# Patient Record
Sex: FEMALE | Race: WHITE | NOT HISPANIC OR LATINO
[De-identification: names, ages, dates, MRNs, and addresses within clinical notes are randomized per-mention and may not be internally consistent; named-entity substitution may affect disease eponyms.]

---

## 2019-05-13 PROBLEM — Z00.00 ENCOUNTER FOR PREVENTIVE HEALTH EXAMINATION: Status: ACTIVE | Noted: 2019-05-13

## 2019-08-02 ENCOUNTER — OTHER (OUTPATIENT)
Age: 39
End: 2019-08-02

## 2019-08-02 ENCOUNTER — APPOINTMENT (OUTPATIENT)
Dept: RHEUMATOLOGY | Facility: CLINIC | Age: 39
End: 2019-08-02
Payer: MEDICAID

## 2019-08-02 VITALS
BODY MASS INDEX: 26.98 KG/M2 | HEIGHT: 68 IN | SYSTOLIC BLOOD PRESSURE: 109 MMHG | OXYGEN SATURATION: 98 % | DIASTOLIC BLOOD PRESSURE: 73 MMHG | HEART RATE: 84 BPM | WEIGHT: 178 LBS

## 2019-08-02 DIAGNOSIS — Z78.9 OTHER SPECIFIED HEALTH STATUS: ICD-10-CM

## 2019-08-02 DIAGNOSIS — K21.9 GASTRO-ESOPHAGEAL REFLUX DISEASE W/OUT ESOPHAGITIS: ICD-10-CM

## 2019-08-02 PROCEDURE — 99204 OFFICE O/P NEW MOD 45 MIN: CPT

## 2019-08-02 RX ORDER — OMEPRAZOLE MAGNESIUM 20 MG/1
20 CAPSULE, DELAYED RELEASE ORAL
Refills: 0 | Status: ACTIVE | COMMUNITY

## 2019-08-02 RX ORDER — CETIRIZINE HYDROCHLORIDE 10 MG/1
10 CAPSULE, LIQUID FILLED ORAL
Refills: 0 | Status: ACTIVE | COMMUNITY

## 2019-08-03 NOTE — PHYSICAL EXAM
[General Appearance - Alert] : alert [General Appearance - Well Nourished] : well nourished [Sclera] : the sclera and conjunctiva were normal [General Appearance - Well Developed] : well developed [Oropharynx] : the oropharynx was normal [Neck Appearance] : the appearance of the neck was normal [Auscultation Breath Sounds / Voice Sounds] : lungs were clear to auscultation bilaterally [Respiration, Rhythm And Depth] : normal respiratory rhythm and effort [Heart Sounds] : normal S1 and S2 [Full Pulse] : the pedal pulses are present [Abdomen Tenderness] : non-tender [Edema] : there was no peripheral edema [Cervical Lymph Nodes Enlarged Anterior Bilaterally] : anterior cervical [Supraclavicular Lymph Nodes Enlarged Bilaterally] : supraclavicular [No Spinal Tenderness] : no spinal tenderness [Abnormal Walk] : normal gait [Nail Clubbing] : no clubbing  or cyanosis of the fingernails [Musculoskeletal - Swelling] : no joint swelling seen [Motor Tone] : muscle strength and tone were normal [FreeTextEntry1] : FROM neck, shoulders, elbows, wrists, hands, hips, knees, ankles and feet, including the small joints of the hands and feet without any evidence of inflammatory arthritis no tender points noted [] : no rash [Motor Exam] : the motor exam was normal [Deep Tendon Reflexes (DTR)] : deep tendon reflexes were 2+ and symmetric [Impaired Insight] : insight and judgment were intact [Oriented To Time, Place, And Person] : oriented to person, place, and time [Affect] : the affect was normal

## 2019-08-03 NOTE — HISTORY OF PRESENT ILLNESS
[FreeTextEntry1] : 38-year-old  female with no significant past medical history presents for further evaluation of joint pains. She has previously seen rheumatology at [place]. She was ruled out for rheumatoid arthritis.\par \par She is concerned that she has rheumatoid arthritis. She complains of pain in her joints, the worst pain is in her hands. She has good and bad days. The pain is definitely worse with rainy weather, when she does excessive work. She complains of pain in her hands occasionally her wrists. Her other joints bother her as well. She does not have relief with over-the-counter NSAIDs. She states that she had recent labs done in her CRP was mildly elevated. In the past she has had x-rays which were normal.\par \par She denies alopecia, oral ulcers, sicca symptoms or rebound phenomenon. She denies asthma, psoriasis or colitis. She denies fevers or chills or night sweats. She admits to morning stiffness, it can last for at least 30 minutes. She denies poor sleep. She denies thromboembolism.\par \par She admits to a total of 6 pregnancies with 4 miscarriages and 2 live births. She was diagnosed with MTHFR mutation during her pregnancy.\par She has a good appetite and denies weight loss. She is otherwise up to date on her age-appropriate screenings.

## 2019-08-03 NOTE — CONSULT LETTER
[Dear  ___] : Dear  [unfilled], [Consult Letter:] : I had the pleasure of evaluating your patient, [unfilled]. [Please see my note below.] : Please see my note below. [Consult Closing:] : Thank you very much for allowing me to participate in the care of this patient.  If you have any questions, please do not hesitate to contact me. [Sincerely,] : Sincerely, [FreeTextEntry3] : Shahnaz Eugene D.O\par

## 2019-08-03 NOTE — ASSESSMENT
[FreeTextEntry1] : 38-year-old  female with no significant past medical history presents for further evaluation of joint pains.\par \par Her current review of systems is positive for pain in most of her joints but her hands at the worst, the pain has been present for the last couple of years, it does wax and wane in nature. She has seen rheumatology and orthopedics, she states she has had x-rays which were normal and has been ruled out for rheumatoid arthritis in the past. More recent blood work shows her to be seronegative CRP was mildly elevated at 0.5. I reviewed these labs.\par \par Today, on examination she has full range of motion of her joints without any evidence of inflammatory arthritis. She does have pain and tenderness in multiple joints. She does not have fibromyalgia on exam.\par \par At this time the suspicion for rheumatoid arthritis or inflammatory arthritis is low. The most likely diagnosis may be early osteoarthritis.\par \par Plan-\par -she is to have repeat labs done.\par -She is to have repeat x-rays of her hands\par -She is concerned about rheumatoid arthritis, for completion I will order an MRI of the left hand to rule out inflammatory arthritis.\par -I have given her prescription for NSAIDs, she can use it on a p.r.n. basis with food.\par -Followup pending labs and imaging.\par -She is aware to call if her symptoms worsen.

## 2019-08-16 ENCOUNTER — OTHER (OUTPATIENT)
Age: 39
End: 2019-08-16

## 2019-08-19 ENCOUNTER — OTHER (OUTPATIENT)
Age: 39
End: 2019-08-19

## 2019-08-20 ENCOUNTER — TRANSCRIPTION ENCOUNTER (OUTPATIENT)
Age: 39
End: 2019-08-20

## 2019-08-21 ENCOUNTER — RX RENEWAL (OUTPATIENT)
Age: 39
End: 2019-08-21

## 2019-08-22 ENCOUNTER — OTHER (OUTPATIENT)
Age: 39
End: 2019-08-22

## 2019-08-30 ENCOUNTER — FORM ENCOUNTER (OUTPATIENT)
Age: 39
End: 2019-08-30

## 2019-08-31 ENCOUNTER — OUTPATIENT (OUTPATIENT)
Dept: OUTPATIENT SERVICES | Facility: HOSPITAL | Age: 39
LOS: 1 days | End: 2019-08-31
Payer: MEDICAID

## 2019-08-31 ENCOUNTER — APPOINTMENT (OUTPATIENT)
Dept: MRI IMAGING | Facility: CLINIC | Age: 39
End: 2019-08-31
Payer: MEDICAID

## 2019-08-31 ENCOUNTER — APPOINTMENT (OUTPATIENT)
Dept: RADIOLOGY | Facility: CLINIC | Age: 39
End: 2019-08-31
Payer: MEDICAID

## 2019-08-31 DIAGNOSIS — M25.50 PAIN IN UNSPECIFIED JOINT: ICD-10-CM

## 2019-08-31 PROCEDURE — 73218 MRI UPPER EXTREMITY W/O DYE: CPT | Mod: 26,LT

## 2019-08-31 PROCEDURE — 73120 X-RAY EXAM OF HAND: CPT | Mod: 26,LT

## 2019-08-31 PROCEDURE — 73218 MRI UPPER EXTREMITY W/O DYE: CPT

## 2019-08-31 PROCEDURE — 73120 X-RAY EXAM OF HAND: CPT

## 2019-10-14 ENCOUNTER — APPOINTMENT (OUTPATIENT)
Dept: RHEUMATOLOGY | Facility: CLINIC | Age: 39
End: 2019-10-14
Payer: MEDICAID

## 2019-10-14 VITALS
OXYGEN SATURATION: 98 % | WEIGHT: 180 LBS | BODY MASS INDEX: 27.28 KG/M2 | DIASTOLIC BLOOD PRESSURE: 72 MMHG | HEIGHT: 68 IN | SYSTOLIC BLOOD PRESSURE: 112 MMHG | HEART RATE: 86 BPM

## 2019-10-14 DIAGNOSIS — Z79.1 LONG TERM (CURRENT) USE OF NON-STEROIDAL ANTI-INFLAMMATORIES (NSAID): ICD-10-CM

## 2019-10-14 PROCEDURE — 99214 OFFICE O/P EST MOD 30 MIN: CPT

## 2019-10-14 RX ORDER — ETODOLAC 400 MG/1
400 TABLET, FILM COATED, EXTENDED RELEASE ORAL
Qty: 30 | Refills: 1 | Status: DISCONTINUED | COMMUNITY
Start: 2019-08-02 | End: 2019-10-14

## 2019-10-14 NOTE — HISTORY OF PRESENT ILLNESS
[FreeTextEntry1] : 38-year-old  female with no significant past medical history presents for further evaluation of joint pains. She has previously seen rheumatology at [place]. She was ruled out for rheumatoid arthritis.\par \par She complains of pain in her joints, the worst pain is in her hands. She has good and bad days. The pain is definitely worse with rainy weather, when she does excessive work. She complains of pain in her hands occasionally her wrists. Her other joints bother her as well. She uses naprosyn with some relief at this time. She states that she had recent labs done in her CRP was mildly elevated. In the past she has had x-rays which were normal.\par With me her HLa b27+, her father has psoriasis. \par She denies alopecia, oral ulcers, sicca symptoms or rebound phenomenon. She denies asthma, psoriasis or colitis. She denies fevers or chills or night sweats. She admits to morning stiffness, it can last for at least 30 minutes. She denies poor sleep. She denies thromboembolism.\par \par She admits to a total of 6 pregnancies with 4 miscarriages and 2 live births. She was diagnosed with MTHFR mutation during her pregnancy.\par She has a good appetite and denies weight loss. She is otherwise up to date on her age-appropriate screenings.

## 2019-10-14 NOTE — ASSESSMENT
[FreeTextEntry1] : 38-year-old  female with no significant past medical history presents for further evaluation of joint pains.\par \par Her current review of systems is positive for pain in most of her joints but her hands at the worst, the pain has been present for the last couple of years, it does wax and wane in nature. She has seen rheumatology and orthopedics, she states she has had x-rays which were normal and has been ruled out for rheumatoid arthritis in the past. More recent blood work shows her to be seronegative CRP was mildly elevated at 0.5. I reviewed these labs.\par \par Today, on examination she has full range of motion of her joints without any evidence of inflammatory arthritis. She does have pain and tenderness in multiple joints. She does not have fibromyalgia on exam.\par Her labs reveal a HLA b27+ but she is nonerosive. \par \par HLA b27+ spondyloarthropathy-\par - it turns out her father has psoriasis\par - she is nonerosive\par  - to have xray SI joints\par - she is currently on naprosyn with relief but questionable\par - if she is nonerosive will continue with NSAID use\par - to review literature for MTX, otezla and TNF-I\par \par NSAID use-\par Risks and benefits of NSAIDS were d/w patient including but not limited to hypertension, nephrotoxicity, GI intolerance , increased risk for thromboembolism, CHF, hepatotoxicity and anemia.\par \par -She is aware to call if her symptoms worsen.\par - f/u 6 weeks

## 2019-10-14 NOTE — PHYSICAL EXAM
[General Appearance - Alert] : alert [General Appearance - Well Nourished] : well nourished [General Appearance - Well Developed] : well developed [Sclera] : the sclera and conjunctiva were normal [Oropharynx] : the oropharynx was normal [Neck Appearance] : the appearance of the neck was normal [Auscultation Breath Sounds / Voice Sounds] : lungs were clear to auscultation bilaterally [Respiration, Rhythm And Depth] : normal respiratory rhythm and effort [Full Pulse] : the pedal pulses are present [Heart Sounds] : normal S1 and S2 [Edema] : there was no peripheral edema [Abdomen Tenderness] : non-tender [Cervical Lymph Nodes Enlarged Anterior Bilaterally] : anterior cervical [Supraclavicular Lymph Nodes Enlarged Bilaterally] : supraclavicular [Abnormal Walk] : normal gait [No Spinal Tenderness] : no spinal tenderness [Musculoskeletal - Swelling] : no joint swelling seen [Nail Clubbing] : no clubbing  or cyanosis of the fingernails [Motor Tone] : muscle strength and tone were normal [] : no rash [FreeTextEntry1] : FROM neck, shoulders, elbows, wrists, hands, hips, knees, ankles and feet, including the small joints of the hands and feet without any evidence of inflammatory arthritis no tender points noted [Deep Tendon Reflexes (DTR)] : deep tendon reflexes were 2+ and symmetric [Motor Exam] : the motor exam was normal [Oriented To Time, Place, And Person] : oriented to person, place, and time [Affect] : the affect was normal [Impaired Insight] : insight and judgment were intact

## 2019-10-14 NOTE — REVIEW OF SYSTEMS
[Feeling Tired] : feeling tired [Joint Pain] : joint pain [Arthralgias] : arthralgias [Negative] : Heme/Lymph

## 2019-10-14 NOTE — CONSULT LETTER
[Dear  ___] : Dear  [unfilled], [Consult Letter:] : I had the pleasure of evaluating your patient, [unfilled]. [Please see my note below.] : Please see my note below. [Consult Closing:] : Thank you very much for allowing me to participate in the care of this patient.  If you have any questions, please do not hesitate to contact me. [FreeTextEntry3] : Shahnaz Eugene D.O\par  [Sincerely,] : Sincerely,

## 2019-10-25 ENCOUNTER — INBOUND DOCUMENT (OUTPATIENT)
Age: 39
End: 2019-10-25

## 2019-12-06 ENCOUNTER — RX RENEWAL (OUTPATIENT)
Age: 39
End: 2019-12-06

## 2020-01-09 ENCOUNTER — RX RENEWAL (OUTPATIENT)
Age: 40
End: 2020-01-09

## 2020-03-04 ENCOUNTER — APPOINTMENT (OUTPATIENT)
Dept: RHEUMATOLOGY | Facility: CLINIC | Age: 40
End: 2020-03-04
Payer: MEDICAID

## 2020-03-04 VITALS
OXYGEN SATURATION: 98 % | BODY MASS INDEX: 27.07 KG/M2 | HEART RATE: 94 BPM | DIASTOLIC BLOOD PRESSURE: 72 MMHG | SYSTOLIC BLOOD PRESSURE: 110 MMHG | WEIGHT: 178 LBS

## 2020-03-04 PROCEDURE — 99214 OFFICE O/P EST MOD 30 MIN: CPT

## 2020-03-04 RX ORDER — FOLIC ACID 1 MG/1
1 TABLET ORAL DAILY
Qty: 30 | Refills: 2 | Status: DISCONTINUED | COMMUNITY
Start: 2019-10-29 | End: 2020-03-04

## 2020-03-04 RX ORDER — METHOTREXATE 10 MG/.2ML
10 INJECTION, SOLUTION SUBCUTANEOUS
Qty: 4 | Refills: 2 | Status: DISCONTINUED | COMMUNITY
Start: 2019-10-29 | End: 2020-03-04

## 2020-03-05 NOTE — HISTORY OF PRESENT ILLNESS
[FreeTextEntry1] : 39-year-old  female with no significant past medical history presents for further evaluation of joint pains. She has previously seen rheumatology at [place]. She was ruled out for rheumatoid arthritis.\par \par She complains of pain in her joints, the worst pain is in her hands. She has good and bad days. The pain is definitely worse with rainy weather, when she does excessive work. She complains of pain in her hands occasionally her wrists. Her other joints bother her as well. She uses naprosyn with some relief at this time. She states that she had recent labs done in her CRP was mildly elevated. In the past she has had x-rays which were normal.\par With me her HLa b27+, her father has psoriasis. \par She denies alopecia, oral ulcers, sicca symptoms or rebound phenomenon. She denies asthma, psoriasis or colitis. She denies fevers or chills or night sweats. She admits to morning stiffness, it can last for at least 30 minutes. She denies poor sleep. She denies thromboembolism.\par she has been on sc MTX but had to stop it because of hair loss. she wants to know what she can do next, she rates her pain and fatigue at 6/10 \par She admits to a total of 6 pregnancies with 4 miscarriages and 2 live births. She was diagnosed with MTHFR mutation during her pregnancy.\par She has a good appetite and denies weight loss. She is otherwise up to date on her age-appropriate screenings.

## 2020-03-05 NOTE — ASSESSMENT
[FreeTextEntry1] : 39-year-old  female with no significant past medical history with HLA B27+ spondyloarthropathy. \par \par Her current review of systems is positive for pain in most of her joints but her hands at the worst, the pain has been present for the last couple of years, it does wax and wane in nature. She has seen rheumatology and orthopedics, she states she has had x-rays which were normal and has been ruled out for rheumatoid arthritis in the past. \par Today, on examination she has full range of motion of her joints without any evidence of inflammatory arthritis. She does have pain and tenderness in multiple joints. She does not have fibromyalgia on exam.\par Her labs reveal a HLA b27+ but she is nonerosive. \par \par HLA b27+ spondyloarthropathy-\par - it turns out her father has psoriasis\par - she is nonerosive\par  - she has now tried SC MTX and had to stop it due to hair loss\par - she is willing to try TNF-I\par - will obtain auth for humira use\par Risks and benefits were d/w patient including but not limited to immunosuppression, reactivation of TB, lymphoma, malignancies, lupus like symptoms, exacerbation of psoriasis, and infections.\par \par \par NSAID use-\par Risks and benefits of NSAIDS were d/w patient including but not limited to hypertension, nephrotoxicity, GI intolerance , increased risk for thromboembolism, CHF, hepatotoxicity and anemia.\par \par -She is aware to call if her symptoms worsen.\par - f/u 6 weeks

## 2020-03-05 NOTE — PHYSICAL EXAM
[General Appearance - Alert] : alert [General Appearance - Well Nourished] : well nourished [General Appearance - Well Developed] : well developed [Sclera] : the sclera and conjunctiva were normal [Oropharynx] : the oropharynx was normal [Neck Appearance] : the appearance of the neck was normal [Respiration, Rhythm And Depth] : normal respiratory rhythm and effort [Auscultation Breath Sounds / Voice Sounds] : lungs were clear to auscultation bilaterally [Heart Sounds] : normal S1 and S2 [Full Pulse] : the pedal pulses are present [Edema] : there was no peripheral edema [Abdomen Tenderness] : non-tender [Cervical Lymph Nodes Enlarged Anterior Bilaterally] : anterior cervical [Supraclavicular Lymph Nodes Enlarged Bilaterally] : supraclavicular [No Spinal Tenderness] : no spinal tenderness [Abnormal Walk] : normal gait [Nail Clubbing] : no clubbing  or cyanosis of the fingernails [Musculoskeletal - Swelling] : no joint swelling seen [Motor Tone] : muscle strength and tone were normal [FreeTextEntry1] : FROM neck, shoulders, elbows, wrists, hands, hips, knees, ankles and feet, including the small joints of the hands and feet without any evidence of inflammatory arthritis no tender points noted [] : no rash [Deep Tendon Reflexes (DTR)] : deep tendon reflexes were 2+ and symmetric [Motor Exam] : the motor exam was normal [Oriented To Time, Place, And Person] : oriented to person, place, and time [Impaired Insight] : insight and judgment were intact [Affect] : the affect was normal

## 2020-04-01 ENCOUNTER — TRANSCRIPTION ENCOUNTER (OUTPATIENT)
Age: 40
End: 2020-04-01

## 2020-04-30 ENCOUNTER — TRANSCRIPTION ENCOUNTER (OUTPATIENT)
Age: 40
End: 2020-04-30

## 2020-10-23 ENCOUNTER — RX RENEWAL (OUTPATIENT)
Age: 40
End: 2020-10-23

## 2020-11-20 NOTE — REASON FOR VISIT
Chief Complaint   Patient presents with     RECHECK     Discuss upcoming surgery     Cris Mcginnis, CMA    [Follow-Up: _____] : a [unfilled] follow-up visit [FreeTextEntry1] : spondyloarthropathy

## 2021-01-12 ENCOUNTER — NON-APPOINTMENT (OUTPATIENT)
Age: 41
End: 2021-01-12

## 2021-02-12 ENCOUNTER — APPOINTMENT (OUTPATIENT)
Dept: RHEUMATOLOGY | Facility: CLINIC | Age: 41
End: 2021-02-12
Payer: MEDICAID

## 2021-02-12 ENCOUNTER — LABORATORY RESULT (OUTPATIENT)
Age: 41
End: 2021-02-12

## 2021-02-12 VITALS
HEIGHT: 68 IN | OXYGEN SATURATION: 94 % | TEMPERATURE: 97.3 F | SYSTOLIC BLOOD PRESSURE: 110 MMHG | WEIGHT: 180 LBS | DIASTOLIC BLOOD PRESSURE: 70 MMHG | BODY MASS INDEX: 27.28 KG/M2 | HEART RATE: 73 BPM

## 2021-02-12 PROCEDURE — 99214 OFFICE O/P EST MOD 30 MIN: CPT

## 2021-02-12 PROCEDURE — 99072 ADDL SUPL MATRL&STAF TM PHE: CPT

## 2021-02-13 NOTE — ASSESSMENT
[FreeTextEntry1] : 40-year-old  female with no significant past medical history with HLA B27+ spondyloarthropathy. \par \par She returns after almost 10 months today.  She has seen rheumatology and orthopedics, she states she has had x-rays which were normal and has been ruled out for rheumatoid arthritis in the past. \par Today, on examination she has full range of motion of her joints without any evidence of inflammatory arthritis. \par Her labs reveal a HLA b27+ but she is nonerosive. \par \par HLA b27+ spondyloarthropathy-\par - it turns out her father has psoriasis\par - she is nonerosive\par  - she has now tried SC MTX and had to stop it due to hair loss\par - She has been on Humira for 2 months, and is about 50% better, stressed importance of being compliant with followup.\par -recent labs are within normal limits.\par -She gives a new history of having inflammation in the eyes, attempted to reach ophthalmology but the office was closed, I will be calling her once I have additional information, concern is for whether she had uveitis. For now we'll continue with the same dose of Humira.\par \par \par NSAID use-\par Risks and benefits of NSAIDS were d/w patient including but not limited to hypertension, nephrotoxicity, GI intolerance , increased risk for thromboembolism, CHF, hepatotoxicity and anemia.\par \par Immunosuppressant use-\par She is aware to hold medication while on antibiotics\par Stressed importance of being compliant with followup\par \par -She is aware to call if her symptoms worsen.\par - f/u  2 months, we'll reassess her response to a full dose of Humira over a four-month period

## 2021-02-13 NOTE — HISTORY OF PRESENT ILLNESS
[FreeTextEntry1] : Followup after close to a year\par she states that she started to use the Humira about 2 months ago because she was nervous about using it\par \par 40-year-old  female with no significant past medical history previously seen rheumatology at [place]. With me her HLa b27+, her father has psoriasis. \par Her joint pain was significant, we decided to try Humira, she was nervous about starting it because of the virus, she started about 2 months ago, she has noticed an improvement, she has less joint pain, she feels that she is about 50% better. She uses it every 2 weeks. She denies any adverse effects to it. She denies any recent infections.\par It turns out that she has seen the ophthalmologist on several occasions now, she was told that she had inflammation in her eyes and was treated with topical eyedrops. She's had 2 separate ophthalmologist, she's not sure of the diagnosis.\par She denies alopecia, oral ulcers, sicca symptoms or rebound phenomenon. She denies asthma, psoriasis or colitis. She denies fevers or chills or night sweats. She admits to morning stiffness, it can last for at least 30 minutes. She denies poor sleep. She denies thromboembolism.\par she was on sc MTX but had to stop it because of hair loss. \par She admits to a total of 6 pregnancies with 4 miscarriages and 2 live births. She was diagnosed with MTHFR mutation during her pregnancy.\par She has a good appetite and denies weight loss. She is otherwise up to date on her age-appropriate screenings.

## 2021-02-16 ENCOUNTER — TRANSCRIPTION ENCOUNTER (OUTPATIENT)
Age: 41
End: 2021-02-16

## 2021-02-16 LAB
APPEARANCE: ABNORMAL
BILIRUBIN URINE: NEGATIVE
BLOOD URINE: NEGATIVE
COLOR: ABNORMAL
CREAT SPEC-SCNC: 269 MG/DL
CREAT/PROT UR: 0.1 RATIO
GLUCOSE QUALITATIVE U: NEGATIVE
KETONES URINE: NEGATIVE
LEUKOCYTE ESTERASE URINE: NEGATIVE
NITRITE URINE: NEGATIVE
PH URINE: 6
PROT UR-MCNC: 13 MG/DL
PROTEIN URINE: NORMAL
SPECIFIC GRAVITY URINE: 1.04
UROBILINOGEN URINE: NORMAL

## 2021-07-21 ENCOUNTER — RX RENEWAL (OUTPATIENT)
Age: 41
End: 2021-07-21

## 2021-08-23 ENCOUNTER — NON-APPOINTMENT (OUTPATIENT)
Age: 41
End: 2021-08-23

## 2021-08-23 ENCOUNTER — APPOINTMENT (OUTPATIENT)
Dept: RHEUMATOLOGY | Facility: CLINIC | Age: 41
End: 2021-08-23
Payer: MEDICAID

## 2021-08-23 VITALS
WEIGHT: 185 LBS | BODY MASS INDEX: 28.04 KG/M2 | DIASTOLIC BLOOD PRESSURE: 71 MMHG | OXYGEN SATURATION: 99 % | SYSTOLIC BLOOD PRESSURE: 113 MMHG | HEIGHT: 68 IN | HEART RATE: 85 BPM | TEMPERATURE: 97.2 F

## 2021-08-23 PROCEDURE — 99072 ADDL SUPL MATRL&STAF TM PHE: CPT

## 2021-08-23 PROCEDURE — 99214 OFFICE O/P EST MOD 30 MIN: CPT

## 2021-08-23 NOTE — HISTORY OF PRESENT ILLNESS
[FreeTextEntry1] : WENDI MOE is a 40 year old woman with HLA B27+ peripheral inflammatory arthritis, initially following with Debi Christie, then transitioned here. + father has psoriasis. SQ MTX initially used but stopped 2/2 hair loss. Started on Humira q2 weeks at last visit in Feb 2021, with improvement in sx at that time. Poor follow-up. ?inflammatory eye disease episodes vs dry eyes vs both. \par \par + total of 6 pregnancies with 4 miscarriages and 2 live births. She was diagnosed with MTHFR mutation during her pregnancy.\par \par -------------\par 8/23/21 -- Ongoing inflammatory joint sx,  mainly in hands and feet. Naproxen daily with GI SE, not helping. Humira well tolerated, but feels like it only lasts 1 week, no infectious sx. S/p Covid vaccine. Some ongoing dry eyes tho she does wear contacts, no inflammatory eye sx but has not seen optho in a while. No rashes, worsening low back pain, loss of spinal ROM, GI sx.

## 2021-08-23 NOTE — PHYSICAL EXAM
[General Appearance - Alert] : alert [General Appearance - Well Nourished] : well nourished [General Appearance - Well Developed] : well developed [Oropharynx] : the oropharynx was normal [Neck Appearance] : the appearance of the neck was normal [Respiration, Rhythm And Depth] : normal respiratory rhythm and effort [Auscultation Breath Sounds / Voice Sounds] : lungs were clear to auscultation bilaterally [Heart Sounds] : normal S1 and S2 [Edema] : there was no peripheral edema [Abdomen Tenderness] : non-tender [Cervical Lymph Nodes Enlarged Anterior Bilaterally] : anterior cervical [Supraclavicular Lymph Nodes Enlarged Bilaterally] : supraclavicular [No Spinal Tenderness] : no spinal tenderness [Abnormal Walk] : normal gait [Nail Clubbing] : no clubbing  or cyanosis of the fingernails [Musculoskeletal - Swelling] : no joint swelling seen [Motor Tone] : muscle strength and tone were normal [Motor Exam] : the motor exam was normal [Oriented To Time, Place, And Person] : oriented to person, place, and time [Impaired Insight] : insight and judgment were intact [Affect] : the affect was normal [PERRL With Normal Accommodation] : pupils were equal in size, round, and reactive to light [Extraocular Movements] : extraocular movements were intact [Outer Ear] : the ears and nose were normal in appearance [Nasal Cavity] : the nasal mucosa and septum were normal [] : the neck was supple [Heart Rate And Rhythm] : heart rate was normal and rhythm regular [Bowel Sounds] : normal bowel sounds [Abdomen Soft] : soft [FreeTextEntry1] : FROM diffusely, no effusions or synovitis. + TTP over scattered toes  [No Focal Deficits] : no focal deficits

## 2021-08-23 NOTE — ASSESSMENT
[FreeTextEntry1] : WENDI MOE is a 40 year old woman with HLA B27+ spondyloarthropathy --primarily peripheral joints and ?eye involvement, dry eyes as well, nonerosive. New to me today. \par \par - c/w Humira as 50% helpful but will increase to qweekly 2/2 wearing off after 1 week\par - prn prednisone up to 10mg/day sparingly with food\par - avoid NSAIDs while on steroids \par - repeat labs as below \par - F/u with optho, arelienlty does not appear to have overt inflammatory eye sx, suspect moreso dry eyes in setting of contacts but pt not interested in switching to glasses at present. Recommended AT q6 for now. \par - given immunosuppressed status, pt knows to call if febrile or unwell, knows to hold medication while on Abx \par - had J&J vaccine, no guidance yet on booster, will follow \par - RTC in 3 months, sooner if new/worsening sx

## 2021-08-23 NOTE — REVIEW OF SYSTEMS
[Arthralgias] : arthralgias [Joint Pain] : joint pain [Dry Eyes] : dryness of the eyes [As Noted in HPI] : as noted in HPI [Joint Swelling] : joint swelling [Joint Stiffness] : joint stiffness [Negative] : Constitutional

## 2021-11-29 ENCOUNTER — APPOINTMENT (OUTPATIENT)
Dept: RHEUMATOLOGY | Facility: CLINIC | Age: 41
End: 2021-11-29

## 2022-01-03 ENCOUNTER — APPOINTMENT (OUTPATIENT)
Dept: RHEUMATOLOGY | Facility: CLINIC | Age: 42
End: 2022-01-03
Payer: COMMERCIAL

## 2022-01-03 DIAGNOSIS — R21 RASH AND OTHER NONSPECIFIC SKIN ERUPTION: ICD-10-CM

## 2022-01-03 DIAGNOSIS — J02.9 ACUTE PHARYNGITIS, UNSPECIFIED: ICD-10-CM

## 2022-01-03 DIAGNOSIS — M19.049 PRIMARY OSTEOARTHRITIS, UNSPECIFIED HAND: ICD-10-CM

## 2022-01-03 PROCEDURE — 99214 OFFICE O/P EST MOD 30 MIN: CPT | Mod: 95

## 2022-01-03 RX ORDER — FLUCONAZOLE 100 MG/1
100 TABLET ORAL DAILY
Qty: 7 | Refills: 0 | Status: ACTIVE | COMMUNITY
Start: 2022-01-03 | End: 1900-01-01

## 2022-01-03 RX ORDER — AZITHROMYCIN 250 MG/1
250 TABLET, FILM COATED ORAL
Qty: 1 | Refills: 0 | Status: ACTIVE | COMMUNITY
Start: 2022-01-03 | End: 1900-01-01

## 2022-01-04 NOTE — ASSESSMENT
[FreeTextEntry1] : WENDI MOE is a 41 year old woman with HLA B27+ spondyloarthropathy --primarily peripheral joints and ?eye involvement, nonerosive. Marked improvement with Humira, joints quiescent, some intermittent rash but not fixed and recent eye exam without ocular inflammation. Current URI/strep sx in setting of immunosuppressed status. + dry eyes as well. \par \par - c/w Humira qweekly \par - prn prednisone up to 10mg/day sparingly with food -- avoid while on Abx, has been using sparingly \par - avoid NSAIDs while on steroids \par - Oct labs reviewed and stable, will mail her repeat orders for prior to next visit \par - Z pack and fluconazole in case of Abx related yeast infection (she reports she is prone to this) as can't get into PMD, advised to make appointment for later this week/early next for eval anyways in case this regimen does not resolve sx. \par - reviewed optho note with her, agree with their plan - c/w AT q6, start Xiidra, limit use of contacts \par - given immunosuppressed status, pt knows to call if febrile or unwell, knows to hold medication while on Abx \par - no booster while + infectious sx, advised Pfizer or Moderna booster once infection resolved \par - RTC in 4-6 months, sooner if new/worsening sx

## 2022-01-04 NOTE — REASON FOR VISIT
[Home] : at home, [unfilled] , at the time of the visit. [Medical Office: ([facility])___] : at the medical office located in  [Verbal consent obtained from patient] : the patient, [unfilled] [Follow-Up: _____] : a [unfilled] follow-up visit [FreeTextEntry1] : Spondyloarthropathy

## 2022-01-04 NOTE — PHYSICAL EXAM
[General Appearance - Alert] : alert [General Appearance - Well Nourished] : well nourished [Extraocular Movements] : extraocular movements were intact [Neck Appearance] : the appearance of the neck was normal [Respiration, Rhythm And Depth] : normal respiratory rhythm and effort [Oriented To Time, Place, And Person] : oriented to person, place, and time [Impaired Insight] : insight and judgment were intact [Affect] : the affect was normal [General Appearance - In No Acute Distress] : in no acute distress [Sclera] : the sclera and conjunctiva were normal [] : no respiratory distress [FreeTextEntry1] : No rash to be visualized today

## 2022-01-04 NOTE — REVIEW OF SYSTEMS
[Dry Eyes] : dryness of the eyes [Arthralgias] : arthralgias [Negative] : Heme/Lymph [As Noted in HPI] : as noted in HPI [Joint Swelling] : no joint swelling [Joint Stiffness] : no joint stiffness

## 2022-01-04 NOTE — HISTORY OF PRESENT ILLNESS
[FreeTextEntry1] : WENDI MOE is a 41 year old woman with HLA B27+ peripheral inflammatory arthritis, initially following with [facility], then transitioned here. + father has psoriasis. SQ MTX initially used but stopped 2/2 hair loss. Started on Humira q2 weeks at last visit in Feb 2021, with improvement in sx at that time. Poor follow-up. ?inflammatory eye disease episodes vs dry eyes vs both. \par \par + total of 6 pregnancies with 4 miscarriages and 2 live births. She was diagnosed with MTHFR mutation during her pregnancy.\par \par -------------\par 8/23/21 -- Ongoing inflammatory joint sx,  mainly in hands and feet. Naproxen daily with GI SE, not helping. Humira well tolerated, but feels like it only lasts 1 week, no infectious sx. S/p Covid vaccine. Some ongoing dry eyes tho she does wear contacts, no inflammatory eye sx but has not seen optho in a while. No rashes, worsening low back pain, loss of spinal ROM, GI sx. \par \par 1/3/22 -- Inflammatory sx well controlled on Humira, very minimal episodes of flares which are responsive to low dose steroids PRN, no SE with meds or increased infectious frequency. Some nonspecific localized rash which is coming and going, no fixed areas, ?psoriasis, none active today.  + currently does have a sore throat with some pus which is consistent with her usual strep episodes but has not been able to get an appointment with PMD or local urgent care 2/2 lack of appointments, no fever or lung sx. Saw optho, no inflammation but worsening sicca and will be starting Xiidra.

## 2022-06-13 ENCOUNTER — NON-APPOINTMENT (OUTPATIENT)
Age: 42
End: 2022-06-13

## 2022-08-12 ENCOUNTER — APPOINTMENT (OUTPATIENT)
Dept: RHEUMATOLOGY | Facility: CLINIC | Age: 42
End: 2022-08-12

## 2022-08-12 PROCEDURE — 99442: CPT

## 2022-08-12 NOTE — PHYSICAL EXAM
[General Appearance - Alert] : alert [General Appearance - Well Nourished] : well nourished [General Appearance - In No Acute Distress] : in no acute distress [Neck Appearance] : the appearance of the neck was normal [] : no respiratory distress [Respiration, Rhythm And Depth] : normal respiratory rhythm and effort [Oriented To Time, Place, And Person] : oriented to person, place, and time [Impaired Insight] : insight and judgment were intact [Affect] : the affect was normal [FreeTextEntry1] : No rash to be visualized today

## 2022-08-22 ENCOUNTER — APPOINTMENT (OUTPATIENT)
Dept: RHEUMATOLOGY | Facility: CLINIC | Age: 42
End: 2022-08-22

## 2022-09-19 NOTE — ASSESSMENT
[FreeTextEntry1] : WENDI MOE is a 41 year old woman with --\par \par # HLA B27+ spondyloarthropathy --primarily peripheral joints and ?eye involvement, nonerosive. Marked improvement with Humira, joints quiescent, some intermittent rash but not fixed and recent eye exam without ocular inflammation. \par - c/w Humira qweekly \par - prn prednisone up to 10mg/day sparingly with food --has been using sparingly \par - avoid NSAIDs while on steroids \par - June labs reviewed and stable, will mail her repeat orders for prior to next visit \par \par # dry eyes\par - c/w AT q6, start Xiidra, limit use of contacts \par - close f/u with optho q6-12 months \par \par # immunosuppressed status\par - pt knows to call if febrile or unwell, knows to hold medication while on Abx \par - UTD with covid boosters \par \par RTC in 6 months, sooner if new/worsening sx \par \par Total time on telephone: 16 minutes

## 2022-09-19 NOTE — REVIEW OF SYSTEMS
[Dry Eyes] : dryness of the eyes [As Noted in HPI] : as noted in HPI [Arthralgias] : arthralgias [Negative] : Integumentary [Joint Swelling] : no joint swelling [Joint Stiffness] : no joint stiffness

## 2022-09-19 NOTE — REASON FOR VISIT
[Follow-Up: _____] : a [unfilled] follow-up visit [Patient] : the patient [Self] : self [This encounter was initiated by telehealth (audio with video) and converted to telephone (audio only) due to technical difficulties.] : This encounter was initiated by telehealth (audio with video) and converted to telephone (audio only) due to technical difficulties. [Home] : at home, [unfilled] , at the time of the visit. [Medical Office: ([facility])___] : at the medical office located in  [Verbal consent obtained from patient] : the patient, [unfilled] [FreeTextEntry1] : Spondyloarthropathy

## 2022-09-19 NOTE — HISTORY OF PRESENT ILLNESS
[FreeTextEntry1] : WENDI MOE is a 41 year old woman with HLA B27+ peripheral inflammatory arthritis, initially following with [facility], then transitioned here. + father has psoriasis. SQ MTX initially used but stopped 2/2 hair loss. Started on Humira q2 weeks at last visit in Feb 2021, with improvement in sx at that time. Poor follow-up. ?inflammatory eye disease episodes vs dry eyes vs both. \par \par + total of 6 pregnancies with 4 miscarriages and 2 live births. She was diagnosed with MTHFR mutation during her pregnancy.\par \par -------------\par 8/23/21 -- Ongoing inflammatory joint sx,  mainly in hands and feet. Naproxen daily with GI SE, not helping. Humira well tolerated, but feels like it only lasts 1 week, no infectious sx. S/p Covid vaccine. Some ongoing dry eyes tho she does wear contacts, no inflammatory eye sx but has not seen optho in a while. No rashes, worsening low back pain, loss of spinal ROM, GI sx. \par \par 1/3/22 -- Inflammatory sx well controlled on Humira, very minimal episodes of flares which are responsive to low dose steroids PRN, no SE with meds or increased infectious frequency. Some nonspecific localized rash which is coming and going, no fixed areas, ?psoriasis, none active today.  + currently does have a sore throat with some pus which is consistent with her usual strep episodes but has not been able to get an appointment with PMD or local urgent care 2/2 lack of appointments, no fever or lung sx. Saw optho, no inflammation but worsening sicca and will be starting Xiidra. \par \par 8/12/22 -- Doing well, inflammatory arthritis sx stable on Humira, infrequently needs short courses of steroids for flares, no rashes, no ocular inflammation. Ongoing dry eyes but stable with Xiidra. No infectious sx.

## 2022-09-27 LAB
ALBUMIN SERPL ELPH-MCNC: 4.5 G/DL
ALP BLD-CCNC: 59 U/L
ALT SERPL-CCNC: 40 U/L
ANION GAP SERPL CALC-SCNC: 12 MMOL/L
AST SERPL-CCNC: 34 U/L
BASOPHILS # BLD AUTO: 0.07 K/UL
BASOPHILS NFR BLD AUTO: 0.9 %
BILIRUB SERPL-MCNC: 0.4 MG/DL
BUN SERPL-MCNC: 8 MG/DL
C3 SERPL-MCNC: 102 MG/DL
C4 SERPL-MCNC: 31 MG/DL
CALCIUM SERPL-MCNC: 9.4 MG/DL
CHLORIDE SERPL-SCNC: 105 MMOL/L
CO2 SERPL-SCNC: 25 MMOL/L
CREAT SERPL-MCNC: 0.6 MG/DL
CRP SERPL-MCNC: <3 MG/L
DSDNA AB SER-ACNC: <12 IU/ML
EGFR: 116 ML/MIN/1.73M2
ENA RNP AB SER IA-ACNC: <0.2 AL
ENA SM AB SER IA-ACNC: <0.2 AL
ENA SS-A AB SER IA-ACNC: <0.2 AL
ENA SS-B AB SER IA-ACNC: <0.2 AL
EOSINOPHIL # BLD AUTO: 0.2 K/UL
EOSINOPHIL NFR BLD AUTO: 2.5 %
ERYTHROCYTE [SEDIMENTATION RATE] IN BLOOD BY WESTERGREN METHOD: 6 MM/HR
FOLATE SERPL-MCNC: 14.6 NG/ML
GLUCOSE SERPL-MCNC: 89 MG/DL
HCT VFR BLD CALC: 42.2 %
HGB BLD-MCNC: 13.9 G/DL
IMM GRANULOCYTES NFR BLD AUTO: 0.2 %
LYMPHOCYTES # BLD AUTO: 3.36 K/UL
LYMPHOCYTES NFR BLD AUTO: 41.5 %
MAN DIFF?: NORMAL
MCHC RBC-ENTMCNC: 30.3 PG
MCHC RBC-ENTMCNC: 32.9 GM/DL
MCV RBC AUTO: 91.9 FL
MONOCYTES # BLD AUTO: 0.61 K/UL
MONOCYTES NFR BLD AUTO: 7.5 %
NEUTROPHILS # BLD AUTO: 3.83 K/UL
NEUTROPHILS NFR BLD AUTO: 47.4 %
PLATELET # BLD AUTO: 246 K/UL
POTASSIUM SERPL-SCNC: 4.6 MMOL/L
PROT SERPL-MCNC: 6.6 G/DL
RBC # BLD: 4.59 M/UL
RBC # FLD: 11.8 %
SODIUM SERPL-SCNC: 141 MMOL/L
TSH SERPL-ACNC: 1.05 UIU/ML
VIT B12 SERPL-MCNC: 781 PG/ML
WBC # FLD AUTO: 8.09 K/UL

## 2022-09-28 LAB
ANA PAT FLD IF-IMP: NORMAL
ANA SER IF-ACNC: ABNORMAL

## 2022-09-29 LAB — HISTONE AB SER QL: 0.8 UNITS

## 2022-10-02 LAB — G6PD SER-CCNC: 13.5 U/G HGB

## 2023-04-03 ENCOUNTER — APPOINTMENT (OUTPATIENT)
Dept: RHEUMATOLOGY | Facility: CLINIC | Age: 43
End: 2023-04-03
Payer: COMMERCIAL

## 2023-04-03 ENCOUNTER — APPOINTMENT (OUTPATIENT)
Dept: RHEUMATOLOGY | Facility: CLINIC | Age: 43
End: 2023-04-03

## 2023-04-03 DIAGNOSIS — M54.41 LUMBAGO WITH SCIATICA, RIGHT SIDE: ICD-10-CM

## 2023-04-03 DIAGNOSIS — M54.42 LUMBAGO WITH SCIATICA, RIGHT SIDE: ICD-10-CM

## 2023-04-03 DIAGNOSIS — G89.29 LUMBAGO WITH SCIATICA, RIGHT SIDE: ICD-10-CM

## 2023-04-03 PROCEDURE — 99213 OFFICE O/P EST LOW 20 MIN: CPT | Mod: 95

## 2023-05-02 NOTE — HISTORY OF PRESENT ILLNESS
[FreeTextEntry1] : WENDI MOE is a 41 year old woman with HLA B27+ peripheral inflammatory arthritis, initially following with [facility], then transitioned here. + father has psoriasis. SQ MTX initially used but stopped 2/2 hair loss. Started on Humira q2 weeks at last visit in Feb 2021, with improvement in sx at that time. Poor follow-up. ?inflammatory eye disease episodes vs dry eyes vs both. \par \par + total of 6 pregnancies with 4 miscarriages and 2 live births. She was diagnosed with MTHFR mutation during her pregnancy.\par \par -------------\par 8/23/21 -- Ongoing inflammatory joint sx,  mainly in hands and feet. Naproxen daily with GI SE, not helping. Humira well tolerated, but feels like it only lasts 1 week, no infectious sx. S/p Covid vaccine. Some ongoing dry eyes tho she does wear contacts, no inflammatory eye sx but has not seen optho in a while. No rashes, worsening low back pain, loss of spinal ROM, GI sx. \par \par 1/3/22 -- Inflammatory sx well controlled on Humira, very minimal episodes of flares which are responsive to low dose steroids PRN, no SE with meds or increased infectious frequency. Some nonspecific localized rash which is coming and going, no fixed areas, ?psoriasis, none active today.  + currently does have a sore throat with some pus which is consistent with her usual strep episodes but has not been able to get an appointment with PMD or local urgent care 2/2 lack of appointments, no fever or lung sx. Saw optho, no inflammation but worsening sicca and will be starting Xiidra. \par \par 8/12/22 -- Doing well, inflammatory arthritis sx stable on Humira, infrequently needs short courses of steroids for flares, no rashes, no ocular inflammation. Ongoing dry eyes but stable with Xiidra. No infectious sx. \par \par 4/3/23 -- R knee progressive pain, prior meniscal damage, also worsening diffuse arthralgias but no overt synovitis, no extra-articular inflammatory sx, no infectious sx. Asking for refill of NSAIDs to use for moderate pain as trying to sparingly use steroids. Sicca stable with drops.

## 2023-05-02 NOTE — REASON FOR VISIT
[Follow-Up: _____] : a [unfilled] follow-up visit [Home] : at home, [unfilled] , at the time of the visit. [Medical Office: ([facility])___] : at the medical office located in  [Patient] : the patient [Self] : self [FreeTextEntry1] : Spondyloarthropathy

## 2023-05-02 NOTE — PHYSICAL EXAM
[General Appearance - Alert] : alert [General Appearance - In No Acute Distress] : in no acute distress [General Appearance - Well Nourished] : well nourished [Sclera] : the sclera and conjunctiva were normal [Neck Appearance] : the appearance of the neck was normal [] : no respiratory distress [FreeTextEntry1] : No overt inflammatory rash [Oriented To Time, Place, And Person] : oriented to person, place, and time [Impaired Insight] : insight and judgment were intact [Affect] : the affect was normal

## 2023-05-02 NOTE — ASSESSMENT
[FreeTextEntry1] : WENDI MOE is a 42 year old woman with --\par \par # HLA B27+ spondyloarthropathy --primarily peripheral joints and ?eye involvement, nonerosive. Marked improvement with Humira, some intermittent rash but not fixed and last eye exam without ocular inflammation. Worsening of arthralgias recently, most concentrated over R knee where she's had prior trauma. \par - c/w Humira qweekly \par - C/w naproxen moderate pain\par - prn prednisone up to 10mg/day sparingly with food for severe pain--aware not to take steroids and NSAIDs simultaneously\par - Recent labs reviewed with her, without overt inflammation\par - Given new arthralgias check x-rays hands, feet, knees, LS spine to eval for any inflammatory changes which may prompt transition of medication\par \par # dry eyes\par - c/w AT q6, start Xiidra, limit use of contacts \par - close f/u with optho q6-12 months \par \par # immunosuppressed status\par - pt knows to call if febrile or unwell, knows to hold medication while on Abx \par - UTD with covid boosters \par \par We will determine RPA pending results \par \par

## 2023-05-02 NOTE — REVIEW OF SYSTEMS
[Dry Eyes] : dryness of the eyes [As Noted in HPI] : as noted in HPI [Arthralgias] : arthralgias [Negative] : Heme/Lymph [Joint Pain] : joint pain [Joint Swelling] : no joint swelling [Joint Stiffness] : no joint stiffness

## 2023-05-31 ENCOUNTER — RX RENEWAL (OUTPATIENT)
Age: 43
End: 2023-05-31

## 2023-05-31 RX ORDER — PREDNISONE 5 MG/1
5 TABLET ORAL
Qty: 60 | Refills: 1 | Status: ACTIVE | COMMUNITY
Start: 2021-08-23 | End: 1900-01-01

## 2023-05-31 RX ORDER — NAPROXEN 375 MG/1
375 TABLET ORAL
Qty: 180 | Refills: 1 | Status: ACTIVE | COMMUNITY
Start: 2019-08-21 | End: 1900-01-01

## 2023-08-08 ENCOUNTER — APPOINTMENT (OUTPATIENT)
Dept: RHEUMATOLOGY | Facility: CLINIC | Age: 43
End: 2023-08-08
Payer: COMMERCIAL

## 2023-08-08 DIAGNOSIS — M25.561 PAIN IN RIGHT KNEE: ICD-10-CM

## 2023-08-08 DIAGNOSIS — G89.29 PAIN IN RIGHT KNEE: ICD-10-CM

## 2023-08-08 PROCEDURE — 99213 OFFICE O/P EST LOW 20 MIN: CPT | Mod: 95

## 2023-08-08 NOTE — ASSESSMENT
[FreeTextEntry1] : Persistent pain R knee despite conservative management including Rx NSAID therapy, IA injection therapy.   XR showed mild degenerative change.  Has hx of meniscal surgery 11 years ago.  +TTP R medial joint line.   Soft tissue injury possible.     Plan: MRI right knee f/u with Dr. Alejandro

## 2023-08-08 NOTE — PHYSICAL EXAM
[TextEntry] : Televideo exam  Right knee does not appear to be more swollen than left knee.  No erythema noted.  Patient palpates along R medial joint line and reports pain.   FROM right knee.

## 2023-08-08 NOTE — HISTORY OF PRESENT ILLNESS
[FreeTextEntry1] : Verbal consent was obtained from patient for 2 way video telehealth visit.   Patient was located in her car in [place].  Provider was located at [address] [place] [address]  She is calling due to persistent pain in right knee.   She has hx of meniscal surgery in same knee 11 years ago.   XR knee 4/28/23 mild degenerative changes.  She was Rx'd naproxen for the knee pain which she took for > 1 month and it did n ot help.   It is exacerbated by weight bearing and movement.

## 2023-08-30 DIAGNOSIS — S83.8X1S SPRAIN OF OTHER SPECIFIED PARTS OF RIGHT KNEE, SEQUELA: ICD-10-CM

## 2023-09-25 ENCOUNTER — APPOINTMENT (OUTPATIENT)
Dept: RHEUMATOLOGY | Facility: CLINIC | Age: 43
End: 2023-09-25

## 2024-01-02 ENCOUNTER — OFFICE (OUTPATIENT)
Dept: URBAN - METROPOLITAN AREA CLINIC 103 | Facility: CLINIC | Age: 44
Setting detail: OPHTHALMOLOGY
End: 2024-01-02
Payer: COMMERCIAL

## 2024-01-02 DIAGNOSIS — H16.001: ICD-10-CM

## 2024-01-02 PROCEDURE — 92012 INTRM OPH EXAM EST PATIENT: CPT | Performed by: OPHTHALMOLOGY

## 2024-01-02 ASSESSMENT — CONFRONTATIONAL VISUAL FIELD TEST (CVF)
OD_FINDINGS: FULL
OS_FINDINGS: FULL

## 2024-01-02 ASSESSMENT — REFRACTION_CURRENTRX
OD_OVR_VA: 20/
OS_OVR_VA: 20/
OD_CYLINDER: -0.25
OS_AXIS: 009
OD_AXIS: 162
OS_VPRISM_DIRECTION: SV
OS_CYLINDER: -0.50
OD_SPHERE: -5.25
OD_VPRISM_DIRECTION: SV
OS_SPHERE: -4.75

## 2024-01-02 ASSESSMENT — REFRACTION_MANIFEST
OD_SPHERE: -5.50
OD_CYLINDER: -0.25
OS_CYLINDER: -0.75
OS_SPHERE: -4.25
OS_AXIS: 017
OD_VA1: 20/20
OS_VA1: 20/20-
OD_AXIS: 175

## 2024-01-02 ASSESSMENT — VASCULARIZATION: OD_VASCULARIZATION: PANNUS

## 2024-01-02 ASSESSMENT — SPHEQUIV_DERIVED
OS_SPHEQUIV: -4.625
OS_SPHEQUIV: -5.25
OD_SPHEQUIV: -6.75
OD_SPHEQUIV: -5.625

## 2024-01-02 ASSESSMENT — REFRACTION_AUTOREFRACTION
OD_AXIS: 085
OS_AXIS: 027
OS_CYLINDER: -0.50
OD_CYLINDER: -0.50
OD_SPHERE: -6.50
OS_SPHERE: -5.00

## 2024-01-02 ASSESSMENT — SUPERFICIAL PUNCTATE KERATITIS (SPK)
OD_SPK: ABSENT
OS_SPK: ABSENT

## 2024-01-29 ENCOUNTER — APPOINTMENT (OUTPATIENT)
Dept: RHEUMATOLOGY | Facility: CLINIC | Age: 44
End: 2024-01-29
Payer: COMMERCIAL

## 2024-01-29 VITALS
HEIGHT: 68 IN | TEMPERATURE: 97.6 F | SYSTOLIC BLOOD PRESSURE: 118 MMHG | HEART RATE: 88 BPM | OXYGEN SATURATION: 98 % | DIASTOLIC BLOOD PRESSURE: 70 MMHG | BODY MASS INDEX: 26.37 KG/M2 | WEIGHT: 174 LBS

## 2024-01-29 DIAGNOSIS — D84.9 IMMUNODEFICIENCY, UNSPECIFIED: ICD-10-CM

## 2024-01-29 DIAGNOSIS — M47.819 SPONDYLOSIS W/OUT MYELOPATHY OR RADICULOPATHY, SITE UNSPECIFIED: ICD-10-CM

## 2024-01-29 DIAGNOSIS — M25.50 PAIN IN UNSPECIFIED JOINT: ICD-10-CM

## 2024-01-29 DIAGNOSIS — Z15.89 GENETIC SUSCEPTIBILITY TO OTHER DISEASE: ICD-10-CM

## 2024-01-29 DIAGNOSIS — M25.552 PAIN IN LEFT HIP: ICD-10-CM

## 2024-01-29 DIAGNOSIS — R53.83 OTHER FATIGUE: ICD-10-CM

## 2024-01-29 DIAGNOSIS — M54.12 RADICULOPATHY, CERVICAL REGION: ICD-10-CM

## 2024-01-29 PROCEDURE — G2211 COMPLEX E/M VISIT ADD ON: CPT

## 2024-01-29 PROCEDURE — 99214 OFFICE O/P EST MOD 30 MIN: CPT

## 2024-01-29 RX ORDER — ESCITALOPRAM OXALATE 20 MG/1
20 TABLET, FILM COATED ORAL
Refills: 0 | Status: ACTIVE | COMMUNITY

## 2024-01-29 RX ORDER — SEMAGLUTIDE 1 MG/.5ML
1 INJECTION, SOLUTION SUBCUTANEOUS
Refills: 0 | Status: ACTIVE | COMMUNITY

## 2024-01-30 PROBLEM — Z15.89 HLA B27 POSITIVE: Status: ACTIVE | Noted: 2019-10-14

## 2024-01-30 LAB
ALBUMIN SERPL ELPH-MCNC: 4.3 G/DL
ALP BLD-CCNC: 56 U/L
ALT SERPL-CCNC: 27 U/L
ANION GAP SERPL CALC-SCNC: 11 MMOL/L
AST SERPL-CCNC: 26 U/L
BASOPHILS # BLD AUTO: 0.06 K/UL
BASOPHILS NFR BLD AUTO: 0.8 %
BILIRUB SERPL-MCNC: 0.3 MG/DL
BUN SERPL-MCNC: 13 MG/DL
CALCIUM SERPL-MCNC: 9 MG/DL
CHLORIDE SERPL-SCNC: 104 MMOL/L
CO2 SERPL-SCNC: 24 MMOL/L
CREAT SERPL-MCNC: 0.63 MG/DL
CRP SERPL-MCNC: <3 MG/L
EGFR: 114 ML/MIN/1.73M2
EOSINOPHIL # BLD AUTO: 0.2 K/UL
EOSINOPHIL NFR BLD AUTO: 2.6 %
ERYTHROCYTE [SEDIMENTATION RATE] IN BLOOD BY WESTERGREN METHOD: 8 MM/HR
GLUCOSE SERPL-MCNC: 91 MG/DL
HBV CORE IGG+IGM SER QL: NONREACTIVE
HBV SURFACE AB SER QL: NONREACTIVE
HBV SURFACE AG SER QL: NONREACTIVE
HCT VFR BLD CALC: 40.8 %
HCV AB SER QL: NONREACTIVE
HCV S/CO RATIO: 0.28 S/CO
HGB BLD-MCNC: 14.1 G/DL
IMM GRANULOCYTES NFR BLD AUTO: 0.3 %
LYMPHOCYTES # BLD AUTO: 3.2 K/UL
LYMPHOCYTES NFR BLD AUTO: 42 %
MAN DIFF?: NORMAL
MCHC RBC-ENTMCNC: 30.3 PG
MCHC RBC-ENTMCNC: 34.6 GM/DL
MCV RBC AUTO: 87.6 FL
MONOCYTES # BLD AUTO: 0.69 K/UL
MONOCYTES NFR BLD AUTO: 9.1 %
NEUTROPHILS # BLD AUTO: 3.45 K/UL
NEUTROPHILS NFR BLD AUTO: 45.2 %
PLATELET # BLD AUTO: 239 K/UL
POTASSIUM SERPL-SCNC: 4.3 MMOL/L
PROT SERPL-MCNC: 6.6 G/DL
RBC # BLD: 4.66 M/UL
RBC # FLD: 12.1 %
SODIUM SERPL-SCNC: 138 MMOL/L
WBC # FLD AUTO: 7.62 K/UL

## 2024-01-30 NOTE — REVIEW OF SYSTEMS
[Dry Eyes] : dryness of the eyes [Arthralgias] : arthralgias [Joint Pain] : joint pain [Negative] : Heme/Lymph [As Noted in HPI] : as noted in HPI [Fever] : no fever [Chills] : no chills [Feeling Tired] : feeling tired [Joint Swelling] : no joint swelling [Joint Stiffness] : no joint stiffness

## 2024-01-30 NOTE — ASSESSMENT
[FreeTextEntry1] : WENDI MOE is a 43 year old woman with --   # HLA B27+ spondyloarthropathy --primarily peripheral joints and ?eye involvement, nonerosive. Marked improvement with Humira, some intermittent rash but not fixed, last eye exam without ocular inflammation. Xrays have been overall stable. Worsening of arthralgias recently, tho no overt inflammatory process so etiology unclear - ?FMS component but lacking some exam features  # L hip MSK tendonitis # L hand parsesthesias, ?neck mediated as radiating from shoulder  - c/w Humira qweekly - pt did self stop for an extended period last year - ?contributing to current sx  - C/w NSAIDs moderate pain - prn prednisone up to 10mg/day sparingly with food for severe pain--aware not to take steroids and NSAIDs simultaneously - she prefers to use NSAIDs  - Repeat labs now - drawn by [facility] today - XR hip and neck  - PT referral   # dry eyes - c/w AT q6, consistent Xiidra, limit use of contacts - conservative measures like warm compresses and sleep mask reviewed  - close f/u with optho q6-12 months - last note appreciated   # immunosuppressed status - pt knows to call if febrile or unwell, knows to hold medication while on Abx   # persistent fatigue and ?poor sleep as needed melatonin most nights  - sleep referral   RTC 4 months

## 2024-01-30 NOTE — HISTORY OF PRESENT ILLNESS
[FreeTextEntry1] : WENDI MOE is a 41 year old woman with HLA B27+ peripheral inflammatory arthritis, initially following with [facility], then transitioned here. + father has psoriasis. SQ MTX initially used but stopped 2/2 hair loss. Started on Humira q2 weeks at last visit in Feb 2021, with improvement in sx at that time. Poor follow-up. ?inflammatory eye disease episodes vs dry eyes vs both.   + total of 6 pregnancies with 4 miscarriages and 2 live births. She was diagnosed with MTHFR mutation during her pregnancy.  ------------- 8/23/21 -- Ongoing inflammatory joint sx,  mainly in hands and feet. Naproxen daily with GI SE, not helping. Humira well tolerated, but feels like it only lasts 1 week, no infectious sx. S/p Covid vaccine. Some ongoing dry eyes tho she does wear contacts, no inflammatory eye sx but has not seen optho in a while. No rashes, worsening low back pain, loss of spinal ROM, GI sx.   1/3/22 -- Inflammatory sx well controlled on Humira, very minimal episodes of flares which are responsive to low dose steroids PRN, no SE with meds or increased infectious frequency. Some nonspecific localized rash which is coming and going, no fixed areas, ?psoriasis, none active today.  + currently does have a sore throat with some pus which is consistent with her usual strep episodes but has not been able to get an appointment with PMD or local urgent care 2/2 lack of appointments, no fever or lung sx. Saw optho, no inflammation but worsening sicca and will be starting Xiidra.   8/12/22 -- Doing well, inflammatory arthritis sx stable on Humira, infrequently needs short courses of steroids for flares, no rashes, no ocular inflammation. Ongoing dry eyes but stable with Xiidra. No infectious sx.   4/3/23 -- R knee progressive pain, prior meniscal damage, also worsening diffuse arthralgias but no overt synovitis, no extra-articular inflammatory sx, no infectious sx. Asking for refill of NSAIDs to use for moderate pain as trying to sparingly use steroids. Sicca stable with drops.   1/29/24 -- R knee exacerbation in Aug but appeared more OA on MRI, s/p meniscal sx with improvement. Few weeks of L hip pain, L hand intermittent paresthesia, no focal weakness. Progressive fatigue. Dry eyes, recent corneal ulcers but self healed, uses daily contacts without issue but does have to pry eyes open in AM, using Xiidra in AM only 2/2 sour taste. Motrin TID with only partial relief. Not currently on prednisone, 5mg is equivalent to one dose of Motrin.

## 2024-01-30 NOTE — PHYSICAL EXAM
[General Appearance - Alert] : alert [General Appearance - In No Acute Distress] : in no acute distress [General Appearance - Well Nourished] : well nourished [Sclera] : the sclera and conjunctiva were normal [Neck Appearance] : the appearance of the neck was normal [Oriented To Time, Place, And Person] : oriented to person, place, and time [Impaired Insight] : insight and judgment were intact [Affect] : the affect was normal [PERRL With Normal Accommodation] : pupils were equal in size, round, and reactive to light [Extraocular Movements] : extraocular movements were intact [Outer Ear] : the ears and nose were normal in appearance [Oropharynx] : the oropharynx was normal [Neck Cervical Mass (___cm)] : no neck mass was observed [Auscultation Breath Sounds / Voice Sounds] : lungs were clear to auscultation bilaterally [Heart Rate And Rhythm] : heart rate was normal and rhythm regular [Heart Sounds] : normal S1 and S2 [Murmurs] : no murmurs [Edema] : there was no peripheral edema [Bowel Sounds] : normal bowel sounds [Abdomen Soft] : soft [Abdomen Tenderness] : non-tender [No CVA Tenderness] : no ~M costovertebral angle tenderness [No Spinal Tenderness] : no spinal tenderness [Nail Clubbing] : no clubbing  or cyanosis of the fingernails [Musculoskeletal - Swelling] : no joint swelling seen [Motor Tone] : muscle strength and tone were normal [FreeTextEntry1] : slightly antalgic gait, no synovitis, no soft tissue tender points, ROM diffusely intact  [] : no rash [No Focal Deficits] : no focal deficits

## 2024-01-31 LAB
M TB IFN-G BLD-IMP: NEGATIVE
QUANTIFERON TB PLUS MITOGEN MINUS NIL: 9.8 IU/ML
QUANTIFERON TB PLUS NIL: 0.02 IU/ML
QUANTIFERON TB PLUS TB1 MINUS NIL: 0 IU/ML
QUANTIFERON TB PLUS TB2 MINUS NIL: 0 IU/ML

## 2024-04-19 RX ORDER — ADALIMUMAB 40MG/0.4ML
40 KIT SUBCUTANEOUS
Qty: 4 | Refills: 5 | Status: ACTIVE | COMMUNITY
Start: 2020-03-04

## 2024-06-03 ENCOUNTER — APPOINTMENT (OUTPATIENT)
Dept: RHEUMATOLOGY | Facility: CLINIC | Age: 44
End: 2024-06-03

## 2024-07-27 ENCOUNTER — OFFICE (OUTPATIENT)
Dept: URBAN - METROPOLITAN AREA CLINIC 103 | Facility: CLINIC | Age: 44
Setting detail: OPHTHALMOLOGY
End: 2024-07-27
Payer: COMMERCIAL

## 2024-07-27 DIAGNOSIS — H20.00: ICD-10-CM

## 2024-07-27 PROCEDURE — 92012 INTRM OPH EXAM EST PATIENT: CPT | Performed by: OPHTHALMOLOGY

## 2024-07-27 ASSESSMENT — CONFRONTATIONAL VISUAL FIELD TEST (CVF)
OD_FINDINGS: FULL
OS_FINDINGS: FULL

## 2024-08-02 ENCOUNTER — OFFICE (OUTPATIENT)
Dept: URBAN - METROPOLITAN AREA CLINIC 103 | Facility: CLINIC | Age: 44
Setting detail: OPHTHALMOLOGY
End: 2024-08-02
Payer: COMMERCIAL

## 2024-08-02 DIAGNOSIS — H20.00: ICD-10-CM

## 2024-08-02 PROCEDURE — 92012 INTRM OPH EXAM EST PATIENT: CPT | Performed by: OPHTHALMOLOGY

## 2024-08-09 ENCOUNTER — OFFICE (OUTPATIENT)
Dept: URBAN - METROPOLITAN AREA CLINIC 103 | Facility: CLINIC | Age: 44
Setting detail: OPHTHALMOLOGY
End: 2024-08-09
Payer: COMMERCIAL

## 2024-08-09 ENCOUNTER — RX ONLY (RX ONLY)
Age: 44
End: 2024-08-09

## 2024-08-09 DIAGNOSIS — H20.00: ICD-10-CM

## 2024-08-09 PROCEDURE — 92012 INTRM OPH EXAM EST PATIENT: CPT | Performed by: OPHTHALMOLOGY

## 2024-08-09 ASSESSMENT — CONFRONTATIONAL VISUAL FIELD TEST (CVF)
OS_FINDINGS: FULL
OD_FINDINGS: FULL

## 2024-08-20 ENCOUNTER — OFFICE (OUTPATIENT)
Dept: URBAN - METROPOLITAN AREA CLINIC 103 | Facility: CLINIC | Age: 44
Setting detail: OPHTHALMOLOGY
End: 2024-08-20
Payer: COMMERCIAL

## 2024-08-20 DIAGNOSIS — H20.00: ICD-10-CM

## 2024-08-20 PROCEDURE — 92012 INTRM OPH EXAM EST PATIENT: CPT | Performed by: OPHTHALMOLOGY

## 2024-08-20 ASSESSMENT — CONFRONTATIONAL VISUAL FIELD TEST (CVF)
OS_FINDINGS: FULL
OD_FINDINGS: FULL